# Patient Record
Sex: FEMALE | Race: OTHER | URBAN - METROPOLITAN AREA
[De-identification: names, ages, dates, MRNs, and addresses within clinical notes are randomized per-mention and may not be internally consistent; named-entity substitution may affect disease eponyms.]

---

## 2017-12-06 ENCOUNTER — HISTORICAL (OUTPATIENT)
Dept: ADMINISTRATIVE | Facility: HOSPITAL | Age: 57
End: 2017-12-06

## 2018-12-28 ENCOUNTER — HISTORICAL (OUTPATIENT)
Dept: ENDOSCOPY | Facility: HOSPITAL | Age: 58
End: 2018-12-28

## 2022-04-11 ENCOUNTER — HISTORICAL (OUTPATIENT)
Dept: ADMINISTRATIVE | Facility: HOSPITAL | Age: 62
End: 2022-04-11

## 2022-04-25 VITALS
WEIGHT: 274 LBS | SYSTOLIC BLOOD PRESSURE: 140 MMHG | DIASTOLIC BLOOD PRESSURE: 82 MMHG | HEIGHT: 69 IN | BODY MASS INDEX: 40.58 KG/M2

## 2022-05-04 NOTE — HISTORICAL [facility] CERNER
This is a historical note converted from Nadir. Formatting and pictures may have been removed.  Please reference Nadir for original formatting and attached multimedia. Chief Complaint  RIGHT FOOT/ANKLE 8-9 MONTHS AGO, SHE SAW DR. ERICKSON WHO PUT HER IN A BOOT AND ORDERED A NCS/EMGS TEST HE WANTED HER TO SEE AN ORTHOPEDIC.  History of Present Illness  She comes in today as a?opinion about her lower back.?She is?being seen by Dr. Kraft for treatment of her ankle and foot on the right side.?She has nerve damage in the right?lower extremity. They wanted this to be evaluated by an orthopedist. She has had EMGs and an MRI of her lumbar spine.?She has?a complicated history and the fact that when she was 17 she ended up having laminectomy and decompression and fusion at L4-5 and L5-S1. Since that time she has always continued?with lower back pain.?She developed some weakening in the right leg during that time.?However she has also developed multiple sclerosis and has had this for 20 years.?Now she has the right?foot problem.  ?  She has constant?pain in her lower back and has so for some time. She has pain that radiates down the right leg.?She has no bowel or bladder problems. She does have numbness and tingling in the right foot.  Review of Systems  z  Physical Exam  Vitals & Measurements  BP:?140/82?  HT:?174?cm? HT:?174?cm? WT:?124.28?kg? WT:?124.28?kg? BMI:?41.05?  Examination of the lumbar spine. There is a well-healed midline incision. She is tender to palpation from posterior superior iliac spines around the pelvic rim and up around the scar.?She has no sciatic notch tenderness. She flexes fingertips to just below her hips but primarily just takes place at both hips. Extension and lateral bending limited because of pain.?She was not asked to walk on heels and toes because she has an obvious acquired pes planus on the right side with limited strength in the?plantar flexors and also in the dorsiflexors of the left  foot.?Knee jerks 1+ but symmetrical ankle jerks on the right eye could not elicit on the left?was trace to 1+. Sitting straight leg raising was negative. Dorsalis pedis posterior tibial pulses are +2 and symmetrical. Supine straight leg raising was positive for back and leg pain bilaterally at 40? with positive Kyle test.?Sensation was equal to light touch.  ?  Abdominal exam there were no masses and no tenderness.  Assessment/Plan  1.?Low back pain  ? I discussed with her that her lower back pain is mechanical. I believe the EMG findings are related to long-standing?lower back problems and more probably there from the time for surgeries. I feel the other portion of EMGs is related to her multiple sclerosis. At this point the posterior tibial?insufficiency?is simply that posterior tibial and?tendon insufficiency. I do feel that it is okay for her to undergo reconstruction I did explain to her that along with the multiple sclerosis there is a?increased likelihood?of problems however I think she would do well.?She understands this and I will see her on an as-needed basis.  Ordered:  Office/Outpatient Visit Level 4 Established [id number] PC, Low back pain  Posterior tibialis tendon insufficiency  L-S radiculopathy  Multiple sclerosis  Ankle pain, [facility], 05/07/18 15:30:00 CDT  XR Spine Lumbar 2 or 3 Views, Routine, 05/07/18 14:51:00 CDT, Back Pain, None, Ambulatory, Rad Type, Low back pain, Not Scheduled, 05/07/18 14:51:00 CDT  ?  2.?Posterior tibialis tendon insufficiency  Ordered:  Office/Outpatient Visit Level 4 Established [id number] PC, Low back pain  Posterior tibialis tendon insufficiency  L-S radiculopathy  Multiple sclerosis  Ankle pain, [facility], 05/07/18 15:30:00 CDT  ?  3.?L-S radiculopathy  Ordered:  Office/Outpatient Visit Level 4 Established [id number] PC, Low back pain  Posterior tibialis tendon insufficiency  L-S radiculopathy  Multiple sclerosis  Ankle pain, [facility], 05/07/18 15:30:00 CDT  ?  4.?Multiple sclerosis  Ordered:  Office/Outpatient Visit Level 4 Established [id number] PC, Low back pain  Posterior tibialis tendon insufficiency  L-S radiculopathy  Multiple sclerosis  Ankle pain, [facility], 05/07/18 15:30:00 CDT  ?  Ankle pain  Ordered:  Office/Outpatient Visit Level 4 Established [id number] PC, Low back pain  Posterior tibialis tendon insufficiency  L-S radiculopathy  Multiple sclerosis  Ankle pain, [facility], 05/07/18 15:30:00 CDT  XR Ankle Right Minimum 3 Views, Routine, 05/07/18 14:04:00 CDT, Pain, None, Ambulatory, Rad Type, Ankle pain, Not Scheduled, 05/07/18 14:04:00 CDT  ?  Foot pain  Ordered:  XR Foot Right Minimum 3 Views, Routine, 05/07/18 14:05:00 CDT, Pain, None, Ambulatory, Rad Type, Foot pain, Not Scheduled, 05/07/18 14:05:00 CDT  ?  Orders:  Clinic Follow-up PRN, 05/07/18 15:30:00 CDT, Future Order, [facility]   Problem List/Past Medical History  Ongoing  Morbid obesity  Historical  Angina  Asthma  Constipation  Diabetes mellitus  Hypertension  Multiple sclerosis  MVP - Mitral valve prolapse  Orthopnea  Pain radiating to left arm  Peptic ulcer  PVD - Peripheral vascular disease  RA - Rheumatoid arthritis  Procedure/Surgical History  Angiocardiography of left heart structures (01/31/2013), Arteriography of renal arteries (01/31/2013), Catheter placement in coronary artery(s) for coronary angiography, including intraprocedural injection(s) for coronary angiography, imaging supervision and interpretation; with left heart catheterization including intraprocedural i. (01/31/2013), Coronary arteriography using two catheters (01/31/2013), Left heart cardiac catheterization (01/31/2013), Renal angio, cardiac cath (01/31/2013), Hysterectomy, lumbar laminectomy, myomectomy.  Medications  amlodipine 5 mg oral tablet, Oral, Daily  aspirin 325 mg oral Delayed Release (EC) tablet, 325 mg= 1 tab(s), Oral,  Daily  benAZEPRIL 40 mg oral tablet, Oral, Daily  cloniDINE 0.1 mg oral tablet, Oral, As Directed, PRN,? ?Not Taking per Prescriber  Decara 50,000 Unit Softgel, [address] International unit= 1 cap(s), Oral, qWeek  Dexilant, 60 mg, Oral, Daily  FOLIC ACID 1MG TABLETS, 1 mg= 1 tab(s), Oral, Daily  LOSARTAN/HCT -12.5, 1 tab(s), Oral, Daily  METFORMIN 500MG TABLETS, 500 mg= 1 tab(s), Oral, BID  METHOTREXATE 50MG/2ML PF SINGLE USE, Intrathecal  PREDNISONE 5 MG TABS, 5 mg= 1 tab(s), Oral, Daily  Protonix 40 mg ORAL enteric coated tablet, 40 mg= 1 tab(s), Oral, BID  TRIAMCINOLONE 0.1% OINTMENT 80GM, 1 kulwinder, TOP, TID  TRIAMCINOLONE ACETONIDE .025 % CREA, 1 kulwinder, TOP, BID  Ultram 50 mg oral tablet, See Instructions  VICTOZA 18 MG/3ML SOPN, 1.2 mg, Subcutaneous, Daily  Allergies  Motrin?(shortness of breath)  Social History  Alcohol - Denies Alcohol Use, 01/30/2013  Never, 12/30/2015  Employment/School - Not employed or in school, 01/31/2013  Unemployed, 12/30/2015  Exercise - Does not exercise, 01/31/2013  Home/Environment - No Risk, 01/31/2013  Nutrition/Health - No Risk, 01/31/2013  Other - No Risk, 01/31/2013  Sexual - No Sexual Activity, 01/31/2013  Substance Abuse - No Risk, 01/31/2013  Never, 12/30/2015  Tobacco - Denies Tobacco Use, 01/30/2013  Never smoker, 12/30/2015  Family History  Family history is unknown  Diagnostic Results  3 views of the lumbar spine. Then an L4-5?and L5-S1 laminotomy.?With a posterior lateral fusion. The fusion does appear to be solid.  ?  3 views of the right ankle are normal. 3 views of the right foot are normal.  ?  EMGs by Dr. Grey of both lower extremities show an L4, L5, S1?radiculitis O. 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  ?  MRI of the lumbar spine?that has been the laminotomies noted at L4-5 and L5-S1.?There is an?solid fusion?in the lateral gutters.?There is no central or bilateral foraminal stenosis.

## 2022-12-12 DIAGNOSIS — M79.601 RIGHT ARM PAIN: Primary | ICD-10-CM

## 2024-09-09 DIAGNOSIS — G35 MULTIPLE SCLEROSIS: Primary | ICD-10-CM
